# Patient Record
Sex: MALE | Race: BLACK OR AFRICAN AMERICAN | URBAN - METROPOLITAN AREA
[De-identification: names, ages, dates, MRNs, and addresses within clinical notes are randomized per-mention and may not be internally consistent; named-entity substitution may affect disease eponyms.]

---

## 2022-09-14 ENCOUNTER — OFFICE VISIT (OUTPATIENT)
Dept: INTERNAL MEDICINE CLINIC | Age: 37
End: 2022-09-14
Payer: COMMERCIAL

## 2022-09-14 VITALS
SYSTOLIC BLOOD PRESSURE: 130 MMHG | WEIGHT: 268 LBS | TEMPERATURE: 96.8 F | HEIGHT: 73 IN | HEART RATE: 79 BPM | OXYGEN SATURATION: 97 % | BODY MASS INDEX: 35.52 KG/M2 | DIASTOLIC BLOOD PRESSURE: 80 MMHG

## 2022-09-14 DIAGNOSIS — K21.9 GASTROESOPHAGEAL REFLUX DISEASE, UNSPECIFIED WHETHER ESOPHAGITIS PRESENT: ICD-10-CM

## 2022-09-14 DIAGNOSIS — E66.9 OBESITY (BMI 30-39.9): ICD-10-CM

## 2022-09-14 DIAGNOSIS — R10.30 ABDOMINAL PAIN, LOWER: ICD-10-CM

## 2022-09-14 DIAGNOSIS — J30.89 OTHER ALLERGIC RHINITIS: ICD-10-CM

## 2022-09-14 DIAGNOSIS — F17.210 SMOKES CIGARETTES: ICD-10-CM

## 2022-09-14 DIAGNOSIS — Z13.9 SCREENING FOR CONDITION: ICD-10-CM

## 2022-09-14 DIAGNOSIS — Z23 NEED FOR IMMUNIZATION AGAINST INFLUENZA: ICD-10-CM

## 2022-09-14 DIAGNOSIS — R03.0 ELEVATED BLOOD PRESSURE READING WITHOUT DIAGNOSIS OF HYPERTENSION: ICD-10-CM

## 2022-09-14 DIAGNOSIS — R31.29 MICROSCOPIC HEMATURIA: ICD-10-CM

## 2022-09-14 DIAGNOSIS — F12.90 MARIJUANA USE: ICD-10-CM

## 2022-09-14 DIAGNOSIS — J45.20 MILD INTERMITTENT ASTHMA WITHOUT COMPLICATION: Primary | ICD-10-CM

## 2022-09-14 LAB
BILIRUBIN, POC: NEGATIVE
BLOOD URINE, POC: NORMAL
CLARITY, POC: CLEAR
COLOR, POC: YELLOW
GLUCOSE URINE, POC: NEGATIVE
KETONES, POC: NEGATIVE
LEUKOCYTE EST, POC: NEGATIVE
NITRITE, POC: NEGATIVE
PH, POC: 6
PROTEIN, POC: NEGATIVE
SPECIFIC GRAVITY, POC: NORMAL
UROBILINOGEN, POC: NORMAL

## 2022-09-14 PROCEDURE — 99406 BEHAV CHNG SMOKING 3-10 MIN: CPT | Performed by: INTERNAL MEDICINE

## 2022-09-14 PROCEDURE — 81002 URINALYSIS NONAUTO W/O SCOPE: CPT | Performed by: INTERNAL MEDICINE

## 2022-09-14 PROCEDURE — 90674 CCIIV4 VAC NO PRSV 0.5 ML IM: CPT | Performed by: INTERNAL MEDICINE

## 2022-09-14 PROCEDURE — 99204 OFFICE O/P NEW MOD 45 MIN: CPT | Performed by: INTERNAL MEDICINE

## 2022-09-14 PROCEDURE — 90471 IMMUNIZATION ADMIN: CPT | Performed by: INTERNAL MEDICINE

## 2022-09-14 RX ORDER — LORATADINE 10 MG/1
10 TABLET ORAL DAILY PRN
Qty: 30 TABLET | Refills: 3 | Status: SHIPPED | OUTPATIENT
Start: 2022-09-14 | End: 2022-10-14

## 2022-09-14 RX ORDER — FAMOTIDINE 20 MG/1
20 TABLET, FILM COATED ORAL 2 TIMES DAILY
Qty: 60 TABLET | Refills: 3 | Status: SHIPPED | OUTPATIENT
Start: 2022-09-14

## 2022-09-14 RX ORDER — ALBUTEROL SULFATE 90 UG/1
2 AEROSOL, METERED RESPIRATORY (INHALATION) EVERY 6 HOURS PRN
Qty: 1 EACH | Refills: 0 | Status: SHIPPED | OUTPATIENT
Start: 2022-09-14

## 2022-09-14 RX ORDER — ALBUTEROL SULFATE 90 UG/1
2 AEROSOL, METERED RESPIRATORY (INHALATION) EVERY 6 HOURS PRN
COMMUNITY
End: 2022-09-14 | Stop reason: SDUPTHER

## 2022-09-14 SDOH — ECONOMIC STABILITY: FOOD INSECURITY: WITHIN THE PAST 12 MONTHS, YOU WORRIED THAT YOUR FOOD WOULD RUN OUT BEFORE YOU GOT MONEY TO BUY MORE.: NEVER TRUE

## 2022-09-14 SDOH — ECONOMIC STABILITY: FOOD INSECURITY: WITHIN THE PAST 12 MONTHS, THE FOOD YOU BOUGHT JUST DIDN'T LAST AND YOU DIDN'T HAVE MONEY TO GET MORE.: NEVER TRUE

## 2022-09-14 ASSESSMENT — PATIENT HEALTH QUESTIONNAIRE - PHQ9
1. LITTLE INTEREST OR PLEASURE IN DOING THINGS: 0
SUM OF ALL RESPONSES TO PHQ QUESTIONS 1-9: 0
SUM OF ALL RESPONSES TO PHQ9 QUESTIONS 1 & 2: 0
2. FEELING DOWN, DEPRESSED OR HOPELESS: 0
SUM OF ALL RESPONSES TO PHQ QUESTIONS 1-9: 0

## 2022-09-14 ASSESSMENT — SOCIAL DETERMINANTS OF HEALTH (SDOH): HOW HARD IS IT FOR YOU TO PAY FOR THE VERY BASICS LIKE FOOD, HOUSING, MEDICAL CARE, AND HEATING?: NOT HARD AT ALL

## 2022-09-14 NOTE — PROGRESS NOTES
SUBJECTIVE:  Sophia Barrera is a 40 y.o. male HERE FOR   Chief Complaint   Patient presents with    New Patient     ESTABLISH CARE       PT HERE TO INITIATE CARE   PREVIOUS PCP : ? NAME - [place]    ASTHMA - SINCE CHILDHOOD, OCC WHEEZING, NO SOB, NO INCREASED INHALER USE  GERD - NOT ON MED. OCC HEARTBURN  C/O ABD PAIN - LOWER ABD. INTERMITTENT. ? DURATION. SHARP PAIN, NO RAD, AIN SCALE 6-7/10. OCC NAUSEA, OCC VOMITING, ? DIARRHEA, No CONSTIPATION, No MELENA, No HEMATOCHEZIA. NO TRAUMA  ELEVATED BP - BP NOTED. NO H/O HTN. NO HEADACHE, NO DIZZINESS  OBESITY - DIET / EXERCISE REVIEWED. WT NOTED  ALLERGIC RHINITIS - OCC NASAL CONGESTION, OCC POSTNASAL DRAINAGE, NO SINUS PRESSURE, NO HA, OCC SNEEZING, + OCC WATERY ITCHY EYES.  + SMOKER - CESSATION ADVISED  + MARIJUANA USE - CESSATION ADVISED  NEEDS FLU VACCINE    DENIES CP, No SOB, No PALPITATIONS, No COUGH, NO F/C  No DYSURIA, No FREQ, ? URGENCY, No HEMATURIA    PMH: REVIEWED AND UPDATED TODAY    PSH: REVIEWED AND UPDATED TODAY    SOCIAL HX: REVIEWED AND UPDATED TODAY    FAMILY HX: REVIEWED AND UPDATED TODAY    ALLERGY:  Patient has no known allergies. MEDS: REVIEWED  Prior to Visit Medications    Medication Sig Taking? Authorizing Provider   albuterol sulfate HFA (VENTOLIN HFA) 108 (90 Base) MCG/ACT inhaler Inhale 2 puffs into the lungs every 6 hours as needed for Wheezing Yes Historical Provider, MD         IMMUNIZATION: INFLUENZA ? 2021, ? PNEUMONIA, COVID - X 2    ROS: COMPREHENSIVE ROS AS IN HX, REST -VE  History obtained from chart review and the patient       OBJECTIVE:   NURSING NOTE AND VITALS REVIEWED  /80 (Site: Left Upper Arm, Position: Sitting, Cuff Size: Large Adult)   Pulse 79   Temp 96.8 °F (36 °C)   Ht 6' 1\" (1.854 m)   Wt 268 lb (121.6 kg)   SpO2 97%   BMI 35.36 kg/m²     NO ACUTE DISTRESS    REPEAT BP: 130/80 (LT), NO ORTHOSTASIS     Body mass index is 35.36 kg/m².      HEENT: NO PALLOR, ANICTERIC, PERRLA, EOMI, NO CONJUNCTIVAL ERYTHEMA,                 NO SINUS TENDERNESS  NECK:  SUPPLE, TRACHEA MIDLINE, NT, NO JVD, NO CB, NO LA, NO TM, NO STIFFNESS  CHEST: RESPY EFFORT NL, GOOD AE, NO W/R/C  HEART: S1S2+ REG, NO M/G/R  ABD: OBESE, SOFT, + TENDERNESS LOWER ABD / SUPRAPUBIC, NO GUARDING, NO RIGIDITY, NO HSM, BS+, NO RASH NOTED  EXT: NO EDEMA, NT, PULSES +. BRIDGETT'S -VE  NEURO: ALERT AND ORIENTED X 3, NO MENINGEAL SIGNS, NO TREMORS, NL GAIT, NO FOCAL DEFICITS  PSYCH: FAIRLY GOOD AFFECT  BACK: NT, NO ROM, NO CVA TENDERNESS     PREVIOUS LABS REVIEWED AND D/W PT    UA: SMALL BLOOD, NO LEUKOCYTES      ASSESSMENT / PLAN:     Diagnosis Orders   1. Mild intermittent asthma without complication  COUNSELLED. ALBUTEROL PRN. MONITOR. MONITOR FOR TRIGGERS- ENVIRONMENTAL MODIFICATION. ADVISED SMOKING CESSATION  MAKE CHANGES AS NEEDED. 2. Gastroesophageal reflux disease, unspecified whether esophagitis present  COUNSELLED. START ON PEPCID 20 MG BID. ANTIREFLUX PRECAUTIONS ADVISED. MONITOR. MAKE CHANGES AS NEEDED. 3. Abdominal pain, lower  COUNSELLED, ANALGESICS PRN  LABS TO EVAL  READDRESS CT IF PERSISTENT SYMPTOMS  MAKE CHANGES AS NEEDED. 4. Elevated blood pressure reading without diagnosis of hypertension  COUNSELLED. BP STABLE.   LOW NA+ / DASH DIET/ EXERCISE. MONITOR. GOAL </= 130/80  MAKE CHANGES AS NEEDED. 5. Obesity (BMI 30-39. 9)  COUNSELLED. DIET/ EXERCISE ADVISED. LIFESTYLE MODIFICATION. WT LOSS ADVISED. LABS TO EVAL. R/O DM, R/O THYROID D/O. MONITOR AND MAKE CHANGES AS NEEDED. 6. Other allergic rhinitis  COUNSELLED. START ON CLARITIN 10 MG DAILY/PRN. MONITOR. MAKE CHANGES AS NEEDED. 7. Smokes cigarettes  Smoking cessation was encouraged. Cessation techniques reviewed today. COUNSELLED. CESSATION ADVISED   MO TOBACCO USE CESSATION INTERMEDIATE 3-10 MINUTES (4 MINS)  COMPLICATIONS OF TOBACCO USE INCLUDING COPD, CANCER, CAD D/W PT  PT VERBALIZED UNDERSTANDING  MAKE CHANGES AS NEEDED.        8. Marijuana use  COUNSELLED. CESSATION ADVISED - READDRESS  MAKE CHANGES AS NEEDED. 9. Need for immunization against influenza  COUNSELLED. S/E D/W PT. FLU VACCINE GIVEN. PT TOLERATED. 10. Microscopic hematuria  COUNSELLED. ABN UA - LAB TO EVAL  MAKE CHANGES AS NEEDED BASED ON RESULT. 11. Screening for condition  COUNSELLED. LABS TO EVAL - OK WITH PT  MAKE CHANGES AS NEEDED.                          MEDICATION SIDE EFFECTS D/W PATIENT    RETURN TO CLINIC WITHIN 2 MONTHS / PRN    FOLLOW UP FOR FASTING LABS

## 2022-09-14 NOTE — PATIENT INSTRUCTIONS
TAKE MED AS ADVISED    DIET/ EXERCISE. FOLLOW UP WITHIN 2 MONTHS / AS NEEDED    FOLLOW UP FOR FASTING 235 [facility] Laboratory Locations - No appointment necessary. @ indicates the location is open Saturdays in addition to Monday through Friday. Call your preferred location for test preparation, business hours and other information you need. [employer] accepts [employer]. [facility]    @ [place] [facility] 3 [facility] [address] [place] [address]. [place], [address]   Ph: [phone] [facility] Lab Svcs. [address]   Ph: [phone]  @ [facility] 315 [facility]   [facility]   Ph: [phone]    [facility] Lab Svcs. [address]   Ph: [phone] @ [place] [facility] Svcs. [address]  Ph: [phone] @ [place] MOB Lab Svcs. [address] [facility] [address]. Thony Coleman [address]   Ph: [phone]     Filippo Ramirez Sv. [address]  Ph: [phone]    [place]   @ [place] [facility] Svcs. [address] [place], [place] [address]   Ph: [phone] Shane Larkin Med. Office Bldg. [address] Rei Larkin, 800 [facility]  Ph: [address] [facility], [facility]   [place] [address] Lab Svcs. 54 [facility]   Ph: [phone] [facility]. Lab Svcs.   211 [facility], [facility]   Ph: [phone]

## 2022-10-10 LAB
A/G RATIO: 1.6 (CALC) (ref 1–2.5)
ALBUMIN SERPL-MCNC: 4.4 G/DL (ref 3.6–5.1)
ALP BLD-CCNC: 54 U/L (ref 36–130)
ALT SERPL-CCNC: 28 U/L (ref 9–46)
AMORPHOUS: NORMAL /HPF
AST SERPL-CCNC: 20 U/L (ref 10–40)
ATYPICAL LYMPHOCYTE RELATIVE PERCENT: ABNORMAL % (ref 0–10)
BACTERIA: NORMAL /HPF
BAND NEUTROPHILS: ABNORMAL %
BANDED NEUTROPHILS ABSOLUTE COUNT: ABNORMAL CELLS/UL (ref 0–750)
BASOPHILS ABSOLUTE: 62 CELLS/UL (ref 0–200)
BASOPHILS RELATIVE PERCENT: 0.7 %
BILIRUB SERPL-MCNC: 0.6 MG/DL (ref 0.2–1.2)
BILIRUBIN URINE: NEGATIVE
BLASTS ABSOLUTE COUNT: ABNORMAL CELLS/UL
BLASTS RELATIVE PERCENT: ABNORMAL %
BLOOD, URINE: NEGATIVE
BUN / CREAT RATIO: NORMAL (CALC) (ref 6–22)
BUN BLDV-MCNC: 14 MG/DL (ref 7–25)
CALCIUM OXALATE CRYSTALS: NORMAL /HPF
CALCIUM SERPL-MCNC: 9.6 MG/DL (ref 8.6–10.3)
CASTS: NORMAL /LPF
CHLORIDE BLD-SCNC: 106 MMOL/L (ref 98–110)
CHOLESTEROL, TOTAL: 174 MG/DL
CHOLESTEROL/HDL RATIO: 3.7 (CALC)
CLARITY: CLEAR
CO2: 25 MMOL/L (ref 20–32)
COLOR: YELLOW
COMMENT: ABNORMAL
COMMENT: NORMAL
COMMENT: NORMAL
CREAT SERPL-MCNC: 0.88 MG/DL (ref 0.6–1.26)
CRYSTALS: NORMAL /HPF
CULTURE: NORMAL
EOSINOPHILS ABSOLUTE: 185 CELLS/UL (ref 15–500)
EOSINOPHILS RELATIVE PERCENT: 2.1 %
EPITHELIAL CELLS, UA: NORMAL /HPF
ESTIMATED GLOMERULAR FILTRATION RATE CREATININE EQUATION: 114 ML/MIN/1.73M2
GLOBULIN: 2.7 G/DL (CALC) (ref 1.9–3.7)
GLUCOSE BLD-MCNC: 87 MG/DL (ref 65–99)
GLUCOSE URINE: NEGATIVE
GRANULAR CASTS: NORMAL /LPF
HBA1C MFR BLD: 4.8 % OF TOTAL HGB
HCT VFR BLD CALC: 42.6 % (ref 38.5–50)
HDLC SERPL-MCNC: 47 MG/DL
HEMOGLOBIN: 14.1 G/DL (ref 13.2–17.1)
HEPATITIS C ANTIBODY: NORMAL
HEPATITIS C VIRUS AB SIGNAL/CU: 0.13
HIV 1+2 AB+HIV1P24 AG, EIA: NORMAL
HYALINE CASTS: NORMAL /LPF
KETONES, URINE: NEGATIVE
LDL CHOLESTEROL CALCULATED: 107 MG/DL (CALC)
LEUKOCYTE ESTERASE, URINE: NEGATIVE
LEUKOCYTES, UA: NORMAL /HPF
LIPASE: 10 U/L (ref 7–60)
LYMPHOCYTES ABSOLUTE: 2640 CELLS/UL (ref 850–3900)
LYMPHOCYTES RELATIVE PERCENT: 30 %
MCH RBC QN AUTO: 33.1 PG (ref 27–33)
MCHC RBC AUTO-ENTMCNC: 33.1 G/DL (ref 32–36)
MCV RBC AUTO: 100 FL (ref 80–100)
METAMYELOCYTES ABSOLUTE COUNT: ABNORMAL CELLS/UL
METAMYELOCYTES RELATIVE PERCENT: ABNORMAL %
MONOCYTES ABSOLUTE: 695 CELLS/UL (ref 200–950)
MONOCYTES RELATIVE PERCENT: 7.9 %
MYELOCYTE PERCENT: ABNORMAL %
MYELOCYTES ABSOLUTE COUNT: ABNORMAL CELLS/UL
NEUTROPHILS ABSOLUTE: 5218 CELLS/UL (ref 1500–7800)
NITRITE, URINE: NEGATIVE
NONHDLC SERPL-MCNC: 127 MG/DL (CALC)
NUCLEATED RED BLOOD CELLS: ABNORMAL /100 WBC
NUCLEATED RED BLOOD CELLS: ABNORMAL CELLS/UL
PDW BLD-RTO: 11.4 % (ref 11–15)
PH UA: 5.5 (ref 5–8)
PLATELET # BLD: 365 THOUSAND/UL (ref 140–400)
PMV BLD AUTO: 9.8 FL (ref 7.5–12.5)
POTASSIUM SERPL-SCNC: 4.4 MMOL/L (ref 3.5–5.3)
PROMYELOCYTES ABSOLUTE COUNT: ABNORMAL CELLS/UL
PROMYELOCYTES PERCENT: ABNORMAL %
PROTEIN UA: NEGATIVE
RBC # BLD: 4.26 MILLION/UL (ref 4.2–5.8)
RBC UA: NORMAL /HPF
RENAL EPITHELIAL, POC: NORMAL /HPF
SEGMENTED NEUTROPHILS RELATIVE PERCENT: 59.3 %
SODIUM BLD-SCNC: 138 MMOL/L (ref 135–146)
SPECIFIC GRAVITY UA: 1.02 (ref 1–1.03)
TOTAL PROTEIN: 7.1 G/DL (ref 6.1–8.1)
TRANSITIONAL EPITHELIAL: NORMAL /HPF
TRIGL SERPL-MCNC: 102 MG/DL
TRIPLE PHOSPHATE CRYSTALS: NORMAL /HPF
TSH ULTRASENSITIVE: 0.92 MIU/L (ref 0.4–4.5)
URATE CRYSTALS, URINE: NORMAL /HPF
VITAMIN D 25-HYDROXY: 12 NG/ML (ref 30–100)
WBC # BLD: 8.8 THOUSAND/UL (ref 3.8–10.8)
YEAST: NORMAL /HPF

## 2022-11-14 ENCOUNTER — OFFICE VISIT (OUTPATIENT)
Dept: INTERNAL MEDICINE CLINIC | Age: 37
End: 2022-11-14
Payer: COMMERCIAL

## 2022-11-14 VITALS
WEIGHT: 271 LBS | SYSTOLIC BLOOD PRESSURE: 130 MMHG | HEART RATE: 83 BPM | BODY MASS INDEX: 35.75 KG/M2 | DIASTOLIC BLOOD PRESSURE: 80 MMHG | OXYGEN SATURATION: 97 %

## 2022-11-14 DIAGNOSIS — R10.30 ABDOMINAL PAIN, LOWER: ICD-10-CM

## 2022-11-14 DIAGNOSIS — J45.20 MILD INTERMITTENT ASTHMA WITHOUT COMPLICATION: ICD-10-CM

## 2022-11-14 DIAGNOSIS — R03.0 ELEVATED BLOOD PRESSURE READING WITHOUT DIAGNOSIS OF HYPERTENSION: ICD-10-CM

## 2022-11-14 DIAGNOSIS — K21.9 GASTROESOPHAGEAL REFLUX DISEASE, UNSPECIFIED WHETHER ESOPHAGITIS PRESENT: Primary | ICD-10-CM

## 2022-11-14 DIAGNOSIS — F17.210 SMOKES CIGARETTES: ICD-10-CM

## 2022-11-14 DIAGNOSIS — J30.89 OTHER ALLERGIC RHINITIS: ICD-10-CM

## 2022-11-14 DIAGNOSIS — F12.90 MARIJUANA USE: ICD-10-CM

## 2022-11-14 DIAGNOSIS — E55.9 VITAMIN D DEFICIENCY: ICD-10-CM

## 2022-11-14 PROCEDURE — 99214 OFFICE O/P EST MOD 30 MIN: CPT | Performed by: INTERNAL MEDICINE

## 2022-11-14 RX ORDER — PANTOPRAZOLE SODIUM 40 MG/1
40 TABLET, DELAYED RELEASE ORAL
Qty: 30 TABLET | Refills: 3 | Status: SHIPPED | OUTPATIENT
Start: 2022-11-14

## 2022-11-14 RX ORDER — ACETAMINOPHEN 160 MG
1 TABLET,DISINTEGRATING ORAL DAILY
Qty: 30 CAPSULE | Refills: 3 | Status: SHIPPED | OUTPATIENT
Start: 2022-11-14

## 2022-11-14 NOTE — PATIENT INSTRUCTIONS
TAKE MED AS ADVISED    DIET/ EXERCISE. FOLLOW UP WITHIN 6 WEEKS / AS NEEDED    FOLLOW UP FOR CAT [address] Area Laboratory Locations - No appointment necessary. @ indicates the location is open Saturdays in addition to Monday through Friday. Call your preferred location for test preparation, business hours and other information you need. [employer] accepts [employer]. [facility]    @ [place] [facility] S[facility]. 3 [facility] [address] [place] [address]. [place], [facility] [address]   Ph: [phone] [facility] M[facility] Lab Svcs. [address]   Ph: [phone]  @ [facility] Lab Svcs. 3[address] [facility]   Ph: [phone]    [facility] Lab Svcs. [address]   Ph: [phone] @ [place] [facility] Svcs. [address]  Ph: [phone] @ [place] [facility] Lab Svcs. [address] [facility] 429   Ph: [phone]     [facility] Svcs. [facility]  Ph: [phone]    [place]   @ [place] Lab Svcs. [address] [facility] [address] [facility] [address] [address]   Ph: [phone] [facility] Med. Office Bldg. 3280 Rei [facility], 800 [facility]  Ph: [address] [place] [address]   [place] [address] Lab Svcs[address] 54 [facility]   Ph: [phone] [facility]. Lab Svcs.   211 [facility], [facility]   Ph: [phone]

## 2022-11-14 NOTE — PROGRESS NOTES
SUBJECTIVE:  Abilio Estrada is a 40 y.o. male HERE FOR   Chief Complaint   Patient presents with    Follow-up        PT HERE FOR EVAL     GERD -  ON MED- NOT HELPING. HEARTBURN - PERSISTENT. + OCC BELCHING  ASTHMA - SINCE CHILDHOOD, OCC WHEEZING, NO SOB, NO INCREASED INHALER USE  ABD PAIN - LOWER ABD - PERSISTENT. INTERMITTENT. Lavanda Candle SHARP PAIN, NO RAD, PAIN SCALE 8/10. OCC NAUSEA,  VOMITING - IMPROVED, OCC DIARRHEA, No CONSTIPATION, No MELENA, No HEMATOCHEZIA. NO TRAUMA  VIT D DEF - LAB D/W PT  ELEVATED BP - BP NOTED. NO H/O HTN. NO HEADACHE, NO DIZZINESS  ALLERGIC RHINITIS - OCC NASAL CONGESTION, OCC POSTNASAL DRAINAGE, NO SINUS PRESSURE, NO HA, OCC SNEEZING, + OCC WATERY ITCHY EYES.  + SMOKER - CESSATION ADVISED  + MARIJUANA USE - CESSATION ADVISED       DENIES CP, No SOB, No PALPITATIONS, No COUGH, NO F/C  No DYSURIA, No FREQ, ? URGENCY, No HEMATURIA    PMH: REVIEWED AND UPDATED TODAY    PSH: REVIEWED AND UPDATED TODAY    SOCIAL HX: REVIEWED AND UPDATED TODAY    FAMILY HX: REVIEWED AND UPDATED TODAY    ALLERGY:  Patient has no known allergies. MEDS: REVIEWED  Prior to Visit Medications    Medication Sig Taking? Authorizing Provider   albuterol sulfate HFA (VENTOLIN HFA) 108 (90 Base) MCG/ACT inhaler Inhale 2 puffs into the lungs every 6 hours as needed for Wheezing or Shortness of Breath Yes John Menendez MD   famotidine (PEPCID) 20 MG tablet Take 1 tablet by mouth 2 times daily Yes John Menendez MD       ROS: COMPREHENSIVE ROS AS IN HX, REST -VE  History obtained from chart review and the patient       OBJECTIVE:   NURSING NOTE AND VITALS REVIEWED  BP (!) 138/90 (Site: Right Upper Arm, Position: Sitting, Cuff Size: Large Adult)   Pulse 83   Wt 271 lb (122.9 kg)   SpO2 97%   BMI 35.75 kg/m²     NO ACUTE DISTRESS    REPEAT BP: 130/80 (RT), NO ORTHOSTASIS     Body mass index is 35.75 kg/m².      HEENT: NO PALLOR, ANICTERIC, PERRLA, EOMI, NO CONJUNCTIVAL ERYTHEMA,                 NO SINUS TENDERNESS  NECK:  SUPPLE, TRACHEA MIDLINE, NT, NO JVD, NO CB, NO LA, NO TM, NO STIFFNESS  CHEST: RESPY EFFORT NL, GOOD AE, NO W/R/C  HEART: S1S2+ REG, NO M/G/R  ABD: OBESE, SOFT, TENDERNESS LOWER ABD, NO GUARDING, NO RIGIDITY, NO HSM, BS+  EXT: NO EDEMA, NT, PULSES +. BRIDGETT'S -VE  NEURO: ALERT AND ORIENTED X 3, NO MENINGEAL SIGNS, NO TREMORS, NL GAIT, NO FOCAL DEFICITS  PSYCH: FAIRLY GOOD AFFECT  BACK: NT, NO ROM, NO CVA TENDERNESS     PREVIOUS LABS REVIEWED AND D/W PT    ASSESSMENT / PLAN:     Diagnosis Orders   1. Gastroesophageal reflux disease, unspecified whether esophagitis present  COUNSELLED. CHANGE TO PROTONIX 40 MG DAILY. ANTIREFLUX PRECAUTIONS ADVISED. MONITOR. MAKE CHANGES AS NEEDED. 2. Mild intermittent asthma without complication  COUNSELLED. ALBUTEROL PRN. MONITOR. MONITOR FOR TRIGGERS- ENVIRONMENTAL MODIFICATION. MAKE CHANGES AS NEEDED. 3. Abdominal pain, lower  COUNSELLED. PERSISTENT SYMPTOMS  LABS D/W PT  CT ABDOMEN PELVIS TO EVAL. MONITOR  MAKE CHANGES AS NEEDED. 4. Vitamin D deficiency  COUNSELLED. ADVISED START ON  VIT D 2000 U DAILY. MONITOR AND MAKE CHANGES AS NEEDED. 5. Elevated blood pressure reading without diagnosis of hypertension  COUNSELLED. REPEAT BP NORMAL  LOW NA+ / DASH DIET/ EXERCISE.   MONITOR. GOAL </= 130/80  MAKE CHANGES AS NEEDED. 6. Other allergic rhinitis  COUNSELLED. MED PRN. MONITOR  MAKE CHANGES AS NEEDED. 7. Smokes cigarettes  Smoking cessation was encouraged. Cessation techniques reviewed today. COUNSELLED. CESSATION ADVISED   COMPLICATIONS OF TOBACCO USE INCLUDING COPD, CANCER, CAD D/W PT  PT VERBALIZED UNDERSTANDING       8. Marijuana use  COUNSELLED. CESSATION ADVISED  MAKE CHANGES AS NEEDED.                      MEDICATION SIDE EFFECTS D/W PATIENT    RETURN TO CLINIC WITHIN 6 WEEKS / PRN    FOLLOW UP FOR CAT SCAN

## 2024-03-07 ENCOUNTER — OFFICE VISIT (OUTPATIENT)
Dept: PRIMARY CARE CLINIC | Age: 39
End: 2024-03-07
Payer: COMMERCIAL

## 2024-03-07 VITALS
HEART RATE: 76 BPM | OXYGEN SATURATION: 98 % | BODY MASS INDEX: 36.18 KG/M2 | DIASTOLIC BLOOD PRESSURE: 82 MMHG | WEIGHT: 273 LBS | TEMPERATURE: 97.2 F | HEIGHT: 73 IN | SYSTOLIC BLOOD PRESSURE: 139 MMHG

## 2024-03-07 DIAGNOSIS — R10.9 CHRONIC ABDOMINAL PAIN: Primary | ICD-10-CM

## 2024-03-07 DIAGNOSIS — G89.29 CHRONIC ABDOMINAL PAIN: Primary | ICD-10-CM

## 2024-03-07 PROCEDURE — 99214 OFFICE O/P EST MOD 30 MIN: CPT | Performed by: STUDENT IN AN ORGANIZED HEALTH CARE EDUCATION/TRAINING PROGRAM

## 2024-03-07 SDOH — ECONOMIC STABILITY: FOOD INSECURITY: WITHIN THE PAST 12 MONTHS, YOU WORRIED THAT YOUR FOOD WOULD RUN OUT BEFORE YOU GOT MONEY TO BUY MORE.: NEVER TRUE

## 2024-03-07 SDOH — ECONOMIC STABILITY: HOUSING INSECURITY
IN THE LAST 12 MONTHS, WAS THERE A TIME WHEN YOU DID NOT HAVE A STEADY PLACE TO SLEEP OR SLEPT IN A SHELTER (INCLUDING NOW)?: NO

## 2024-03-07 SDOH — HEALTH STABILITY: PHYSICAL HEALTH: ON AVERAGE, HOW MANY MINUTES DO YOU ENGAGE IN EXERCISE AT THIS LEVEL?: 40 MIN

## 2024-03-07 SDOH — ECONOMIC STABILITY: FOOD INSECURITY: WITHIN THE PAST 12 MONTHS, THE FOOD YOU BOUGHT JUST DIDN'T LAST AND YOU DIDN'T HAVE MONEY TO GET MORE.: NEVER TRUE

## 2024-03-07 SDOH — HEALTH STABILITY: PHYSICAL HEALTH: ON AVERAGE, HOW MANY DAYS PER WEEK DO YOU ENGAGE IN MODERATE TO STRENUOUS EXERCISE (LIKE A BRISK WALK)?: 1 DAY

## 2024-03-07 SDOH — ECONOMIC STABILITY: INCOME INSECURITY: HOW HARD IS IT FOR YOU TO PAY FOR THE VERY BASICS LIKE FOOD, HOUSING, MEDICAL CARE, AND HEATING?: NOT HARD AT ALL

## 2024-03-07 ASSESSMENT — PATIENT HEALTH QUESTIONNAIRE - PHQ9
1. LITTLE INTEREST OR PLEASURE IN DOING THINGS: 0
SUM OF ALL RESPONSES TO PHQ QUESTIONS 1-9: 0
SUM OF ALL RESPONSES TO PHQ9 QUESTIONS 1 & 2: 0
2. FEELING DOWN, DEPRESSED OR HOPELESS: 0

## 2024-03-07 ASSESSMENT — ENCOUNTER SYMPTOMS
ABDOMINAL PAIN: 1
NAUSEA: 0
SHORTNESS OF BREATH: 0
DIARRHEA: 1
VOMITING: 0
WHEEZING: 0
COUGH: 0

## 2024-03-07 NOTE — PROGRESS NOTES
3/7/2024     Maxi Cueto (:  1985) is a 38 y.o. male, here for evaluation of the following medical concerns:    HPI  Abdominal Pain  Patient complains of abdominal pain. The pain is described as aching, and is 3/10 in intensity. The patient is experiencing periumbilical pain without radiation. Onset was several years ago.  He has actually been seen by a few different doctors, but never followed the workup completely through.  Symptoms have been unchanged. Aggravating factors: alcohol.  Alleviating factors: Time. Associated symptoms: diarrhea and loose stools.  Not always completely watery. The patient denies anorexia, arthralagias, constipation, dysuria, fever, frequency, headache, hematochezia, hematuria, melena, myalgias, nausea, and vomiting.      Review of Systems   Constitutional:  Negative for activity change, chills, fever and unexpected weight change.   Respiratory:  Negative for cough, shortness of breath and wheezing.    Gastrointestinal:  Positive for abdominal pain and diarrhea. Negative for nausea and vomiting.   Genitourinary:  Negative for dysuria and frequency.   Neurological:  Negative for dizziness, weakness and light-headedness.       Prior to Visit Medications    Medication Sig Taking? Authorizing Provider   albuterol sulfate HFA (VENTOLIN HFA) 108 (90 Base) MCG/ACT inhaler Inhale 2 puffs into the lungs every 6 hours as needed for Wheezing or Shortness of Breath Yes Michelle Rivera MD        Social History     Tobacco Use    Smoking status: Every Day     Current packs/day: 1.00     Average packs/day: 1 pack/day for 15.0 years (15.0 ttl pk-yrs)     Types: Cigarettes     Passive exposure: Past    Smokeless tobacco: Never   Substance Use Topics    Alcohol use: Yes     Alcohol/week: 6.0 standard drinks of alcohol     Types: 6 Cans of beer per week        Vitals:    24 1126   BP: 139/82   Pulse: 76   Temp: 97.2 °F (36.2 °C)   SpO2: 98%   Weight: 123.8 kg (273 lb)   Height:

## 2024-07-02 ENCOUNTER — HOSPITAL ENCOUNTER (OUTPATIENT)
Dept: CT IMAGING | Age: 39
Discharge: HOME | End: 2024-07-02
Payer: COMMERCIAL

## 2024-07-02 ENCOUNTER — TELEPHONE (OUTPATIENT)
Dept: PRIMARY CARE CLINIC | Age: 39
End: 2024-07-02

## 2024-07-02 DIAGNOSIS — G89.29 CHRONIC ABDOMINAL PAIN: ICD-10-CM

## 2024-07-02 DIAGNOSIS — R10.9 CHRONIC ABDOMINAL PAIN: ICD-10-CM

## 2024-07-02 PROCEDURE — 74177 CT ABD & PELVIS W/CONTRAST: CPT

## 2024-07-02 PROCEDURE — 6360000004 HC RX CONTRAST MEDICATION: Performed by: STUDENT IN AN ORGANIZED HEALTH CARE EDUCATION/TRAINING PROGRAM

## 2024-07-02 RX ADMIN — IOPAMIDOL 75 ML: 755 INJECTION, SOLUTION INTRAVENOUS at 09:26

## 2024-07-02 NOTE — TELEPHONE ENCOUNTER
----- Message from Ronnie Faulkner DO sent at 7/2/2024 12:57 PM EDT -----  CT scan is abnormal; needs to call the gastroenterologist I referred him too. Needs endoscopy.  ----- Message -----  From: Artur Barrios Incoming Orders Results To [facility]  Sent: 7/2/2024  12:27 PM EDT  To: Ronnie Faulkner DO

## 2024-07-17 ENCOUNTER — TELEPHONE (OUTPATIENT)
Dept: PRIMARY CARE CLINIC | Age: 39
End: 2024-07-17

## 2024-07-17 ENCOUNTER — PATIENT MESSAGE (OUTPATIENT)
Dept: PRIMARY CARE CLINIC | Age: 39
End: 2024-07-17

## 2024-07-17 NOTE — TELEPHONE ENCOUNTER
Attempted to reach patient twice to advise the followin2024 appointment with Dr. Artem Gamino will be canceled, as patient has scheduled and canceled multiple times with physician and not kept appointment.   Call kept dropping, so will send MyChart message. If MyChart not answered by EOW, will call patient again.

## 2024-07-19 NOTE — TELEPHONE ENCOUNTER
Discussed appointment cancellation for 7/22 with patient.   Patient states \"What the F do you mean I can't be seen. I have the right to cancel as many times as I want with little notice, I was told this.\"  Did not engage in back and forth with patient, only gave the following:   Patient was given Central Scheduling number [phone] and advised to call to find a new provider.   Patient has also been established with Dr. Rivera office in the past, and it was recommended he also try there.   Patient agreed and hung up.

## 2024-08-07 ENCOUNTER — ANESTHESIA (OUTPATIENT)
Dept: ENDOSCOPY | Age: 39
End: 2024-08-07
Payer: COMMERCIAL

## 2024-08-07 ENCOUNTER — ANESTHESIA EVENT (OUTPATIENT)
Dept: ENDOSCOPY | Age: 39
End: 2024-08-07
Payer: COMMERCIAL

## 2024-08-07 ENCOUNTER — HOSPITAL ENCOUNTER (OUTPATIENT)
Age: 39
Setting detail: OUTPATIENT SURGERY
Discharge: HOME | End: 2024-08-07
Attending: INTERNAL MEDICINE | Admitting: INTERNAL MEDICINE
Payer: COMMERCIAL

## 2024-08-07 VITALS
HEIGHT: 73 IN | WEIGHT: 250 LBS | OXYGEN SATURATION: 100 % | SYSTOLIC BLOOD PRESSURE: 152 MMHG | DIASTOLIC BLOOD PRESSURE: 100 MMHG | HEART RATE: 66 BPM | RESPIRATION RATE: 18 BRPM | TEMPERATURE: 97.8 F | BODY MASS INDEX: 33.13 KG/M2

## 2024-08-07 DIAGNOSIS — R11.2 NAUSEA AND VOMITING, UNSPECIFIED VOMITING TYPE: ICD-10-CM

## 2024-08-07 PROCEDURE — 7100000010 HC PHASE II RECOVERY - FIRST 15 MIN: Performed by: INTERNAL MEDICINE

## 2024-08-07 PROCEDURE — 2580000003 HC RX 258: Performed by: ANESTHESIOLOGY

## 2024-08-07 PROCEDURE — 6360000002 HC RX W HCPCS: Performed by: NURSE ANESTHETIST, CERTIFIED REGISTERED

## 2024-08-07 PROCEDURE — 2709999900 HC NON-CHARGEABLE SUPPLY: Performed by: INTERNAL MEDICINE

## 2024-08-07 PROCEDURE — 88305 TISSUE EXAM BY PATHOLOGIST: CPT

## 2024-08-07 PROCEDURE — 3700000000 HC ANESTHESIA ATTENDED CARE: Performed by: INTERNAL MEDICINE

## 2024-08-07 PROCEDURE — 3609012400 HC EGD TRANSORAL BIOPSY SINGLE/MULTIPLE: Performed by: INTERNAL MEDICINE

## 2024-08-07 PROCEDURE — 6360000002 HC RX W HCPCS: Performed by: ANESTHESIOLOGY

## 2024-08-07 PROCEDURE — 7100000011 HC PHASE II RECOVERY - ADDTL 15 MIN: Performed by: INTERNAL MEDICINE

## 2024-08-07 PROCEDURE — 3700000001 HC ADD 15 MINUTES (ANESTHESIA): Performed by: INTERNAL MEDICINE

## 2024-08-07 RX ORDER — LIDOCAINE HCL/PF 100 MG/5ML
SYRINGE (ML) INJECTION PRN
Status: DISCONTINUED | OUTPATIENT
Start: 2024-08-07 | End: 2024-08-07 | Stop reason: SDUPTHER

## 2024-08-07 RX ORDER — SODIUM CHLORIDE, SODIUM LACTATE, POTASSIUM CHLORIDE, CALCIUM CHLORIDE 600; 310; 30; 20 MG/100ML; MG/100ML; MG/100ML; MG/100ML
INJECTION, SOLUTION INTRAVENOUS CONTINUOUS
Status: DISCONTINUED | OUTPATIENT
Start: 2024-08-07 | End: 2024-08-07 | Stop reason: HOSPADM

## 2024-08-07 RX ORDER — PROPOFOL 10 MG/ML
INJECTION, EMULSION INTRAVENOUS PRN
Status: DISCONTINUED | OUTPATIENT
Start: 2024-08-07 | End: 2024-08-07 | Stop reason: SDUPTHER

## 2024-08-07 RX ORDER — ONDANSETRON 2 MG/ML
4 INJECTION INTRAMUSCULAR; INTRAVENOUS ONCE
Status: COMPLETED | OUTPATIENT
Start: 2024-08-07 | End: 2024-08-07

## 2024-08-07 RX ADMIN — PROPOFOL 100 MG: 10 INJECTION, EMULSION INTRAVENOUS at 08:02

## 2024-08-07 RX ADMIN — PROPOFOL 175 MCG/KG/MIN: 10 INJECTION, EMULSION INTRAVENOUS at 08:01

## 2024-08-07 RX ADMIN — SODIUM CHLORIDE, POTASSIUM CHLORIDE, SODIUM LACTATE AND CALCIUM CHLORIDE: 600; 310; 30; 20 INJECTION, SOLUTION INTRAVENOUS at 07:26

## 2024-08-07 RX ADMIN — Medication 100 MG: at 08:02

## 2024-08-07 RX ADMIN — ONDANSETRON 4 MG: 2 INJECTION INTRAMUSCULAR; INTRAVENOUS at 07:27

## 2024-08-07 ASSESSMENT — PAIN - FUNCTIONAL ASSESSMENT: PAIN_FUNCTIONAL_ASSESSMENT: 0-10

## 2024-08-07 NOTE — DISCHARGE INSTRUCTIONS
ENDOSCOPY DISCHARGE INSTRUCTIONS:    Call the physician that did your procedure for any questions or concerns:           DR. HERNANDEZ:  [phone]               ACTIVITY:    There are potential side effects from the medications used for sedation and anesthesia during your procedure.  These include:  Dizziness or light-headedness, confusion or memory loss, delayed reaction times, loss of coordination, nausea and vomiting.  Because of your increased risk for injury, we ask that you observe the following precautions:  For the next 24 hours,  DO NOT operate an automobile, bicycle, motorcycle, , power tools or large equipment of any kind.  Do not drink alcohol, sign any legal documents or make any legal decisions for 24 hours.  Do not bend your head over lower than your heart.  DO sit on the side of bed/couch awhile before getting up.  Plan on bedrest or quiet relaxation today.  You may resume normal activities in 24 hours.    DIET:    Your first meal today should be light, avoiding spicy and fatty foods.  If you tolerate this first meal, then you may advance to your regular diet unless otherwise advised by your physician.    NORMAL SYMPTOMS:  -Mild sore throat if you’ve had an EGD   -Gaseous discomfort if you've had an EGD or Colonoscopy.     NOTIFY YOUR PHYSICIAN IF THESE SYMPTOMS OCCUR:  1. Fever (greater than 100)  5. Increased abdominal bloating  2. Severe pain    6. Excessive bleeding  3. Nausea and vomiting  7. Chest pain                                                                    4. Chills    8. Shortness of breath      ADDITIONAL INSTRUCTIONS:    Biopsy results: To be discussed at your follow-up visit.    Educational Information:    Follow up: Schedule an appointment with Dr. Hernandez for two weeks from now if you have not already done so.      Please review these discharge instructions this evening or tomorrow for  information you may have forgotten.            We want to thank you for choosing the

## 2024-08-07 NOTE — H&P
History and Physical / Pre-Sedation Assessment    Patient:  Maxi Cueto   :   1985     Intended Procedure:  egd    HPI: nausea and vomiting, abdominal pain    Past Medical History:   has a past medical history of Allergic rhinitis, Asthma, Elevated blood pressure reading, and GERD (gastroesophageal reflux disease).     Past Surgical History:   has a past surgical history that includes Tonsillectomy.     Medications:  Prior to Admission medications    Medication Sig Start Date End Date Taking? Authorizing Provider   albuterol sulfate HFA (VENTOLIN HFA) 108 (90 Base) MCG/ACT inhaler Inhale 2 puffs into the lungs every 6 hours as needed for Wheezing or Shortness of Breath 22   Michelle Rivera MD       Family History:  family history includes Asthma in his maternal cousin; Diabetes in his mother; Heart Attack in his maternal grandmother; High Blood Pressure in his maternal grandmother and mother; Stroke in his maternal grandmother.    Social History:   reports that he has quit smoking. His smoking use included cigarettes. He has a 15.0 pack-year smoking history. He has been exposed to tobacco smoke. He has never used smokeless tobacco. He reports current alcohol use of about 6.0 standard drinks of alcohol per week. He reports current drug use. Drug: Marijuana (Weed).    Allergies:  Patient has no known allergies.    ROS:  twelve point system review was unremarkable except for above noted history.    Nurses notes reviewed and agreed.  Medications reviewed    Physical Exam:  Vital Signs: BP (!) 169/106   Pulse 76   Temp 98.1 °F (36.7 °C) (Temporal)   Resp 16   Ht 1.854 m (6' 1\")   Wt 113.4 kg (250 lb)   SpO2 97%   BMI 32.98 kg/m²    Skin: normal  HEENT: normal  Neck: supple. No adenopathy. No thyromegaly. No JVD.   Pulmonary:Normal  Cardiac:Normal  Abdomen:Normal  MS: normal  Neuro: normal  Ext: no edema. Pulses normal    Pre-Procedure Assessment / Plan:  ASA 2 - Patient with mild systemic

## 2024-08-07 NOTE — ANESTHESIA POSTPROCEDURE EVALUATION
Department of Anesthesiology  Postprocedure Note    Patient: Maxi Cueto  MRN: [id number]  YOB: 1985  Date of evaluation: 8/7/2024    Procedure Summary       Date: 08/07/24 Room / Location: [place] [address] / [facility]    Anesthesia Start: 0757 Anesthesia Stop: 0815    Procedure: ESOPHAGOGASTRODUODENOSCOPY Diagnosis:       Nausea and vomiting, unspecified vomiting type      (Nausea and vomiting, unspecified vomiting type [R11.2])    Surgeons: Elana White MD Responsible Provider: Gio Pollard MD    Anesthesia Type: MAC ASA Status: 3            Anesthesia Type: No value filed.    Marek Phase I: Marek Score: 10    Marek Phase II: Marek Score: 10    Anesthesia Post Evaluation    Patient location during evaluation: PACU  Patient participation: complete - patient participated  Level of consciousness: awake  Pain score: 0  Airway patency: patent  Nausea & Vomiting: no nausea  Cardiovascular status: hemodynamically stable  Respiratory status: acceptable  Hydration status: stable  Pain management: adequate    No notable events documented.

## 2024-08-07 NOTE — PROGRESS NOTES
Ambulatory Surgery/Procedure Discharge Note    Vitals:    08/07/24 0830   BP: (!) 152/100   Pulse: 66   Resp: 18   Temp:    SpO2: 100%     BP within 20% of pre- procedural BP level. OK to DC per Dr. Pollard   In: 200 [P.O.:200]  Out: -     Restroom use offered before discharge.  Yes    Pain assessment:  none  Pain Level: 0        Patient discharged to home/self care. Patient discharged via wheel chair by transporter to waiting family/S.O.       8/7/2024 8:58 AM

## 2024-08-07 NOTE — ANESTHESIA PRE PROCEDURE
Department of Anesthesiology  Preprocedure Note       Name:  Maxi Cueto   Age:  39 y.o.  :  1985                                          MRN:  [id number]         Date:  2024      Surgeon: Surgeon(s):  Elana White MD    Procedure: Procedure(s):  ESOPHAGOGASTRODUODENOSCOPY    Medications prior to admission:   Prior to Admission medications    Medication Sig Start Date End Date Taking? Authorizing Provider   albuterol sulfate HFA (VENTOLIN HFA) 108 (90 Base) MCG/ACT inhaler Inhale 2 puffs into the lungs every 6 hours as needed for Wheezing or Shortness of Breath 22   Michelle Rivera MD       Current medications:    Current Facility-Administered Medications   Medication Dose Route Frequency Provider Last Rate Last Admin   • lactated ringers IV soln infusion   IntraVENous Continuous Idris Carter MD           Allergies:  No Known Allergies    Problem List:  There is no problem list on file for this patient.      Past Medical History:        Diagnosis Date   • Allergic rhinitis    • Asthma    • Elevated blood pressure reading    • GERD (gastroesophageal reflux disease)        Past Surgical History:        Procedure Laterality Date   • TONSILLECTOMY         Social History:    Social History     Tobacco Use   • Smoking status: Every Day     Current packs/day: 1.00     Average packs/day: 1 pack/day for 15.0 years (15.0 ttl pk-yrs)     Types: Cigarettes     Passive exposure: Past   • Smokeless tobacco: Never   Substance Use Topics   • Alcohol use: Yes     Alcohol/week: 6.0 standard drinks of alcohol     Types: 6 Cans of beer per week                                Ready to quit: Not Answered  Counseling given: Not Answered      Vital Signs (Current): There were no vitals filed for this visit.                                           BP Readings from Last 3 Encounters:   24 139/82   22 130/80   22 130/80       NPO Status:

## (undated) DEVICE — FORCEPS BX L240CM JAW DIA2.4MM ORNG L CAP W/ NDL DISP RAD